# Patient Record
Sex: FEMALE | Race: WHITE
[De-identification: names, ages, dates, MRNs, and addresses within clinical notes are randomized per-mention and may not be internally consistent; named-entity substitution may affect disease eponyms.]

---

## 2017-05-08 ENCOUNTER — HOSPITAL ENCOUNTER (EMERGENCY)
Dept: HOSPITAL 45 - C.EDB | Age: 27
Discharge: HOME | End: 2017-05-08
Payer: COMMERCIAL

## 2017-05-08 VITALS
BODY MASS INDEX: 43.4 KG/M2 | HEIGHT: 69.02 IN | WEIGHT: 293 LBS | BODY MASS INDEX: 43.4 KG/M2 | WEIGHT: 293 LBS | HEIGHT: 69.02 IN

## 2017-05-08 VITALS — TEMPERATURE: 98.06 F

## 2017-05-08 VITALS — HEART RATE: 74 BPM | OXYGEN SATURATION: 100 % | SYSTOLIC BLOOD PRESSURE: 124 MMHG | DIASTOLIC BLOOD PRESSURE: 71 MMHG

## 2017-05-08 DIAGNOSIS — K62.5: Primary | ICD-10-CM

## 2017-05-08 DIAGNOSIS — C95.91: ICD-10-CM

## 2017-05-08 LAB
ALP SERPL-CCNC: 87 U/L (ref 45–117)
ALT SERPL-CCNC: 21 U/L (ref 12–78)
ANION GAP SERPL CALC-SCNC: 8 MMOL/L (ref 3–11)
AST SERPL-CCNC: 13 U/L (ref 15–37)
BASOPHILS # BLD: 0.03 K/UL (ref 0–0.2)
BASOPHILS NFR BLD: 0.4 %
BUN SERPL-MCNC: 8 MG/DL (ref 7–18)
BUN/CREAT SERPL: 13.2 (ref 10–20)
CALCIUM SERPL-MCNC: 9.2 MG/DL (ref 8.5–10.1)
CHLORIDE SERPL-SCNC: 106 MMOL/L (ref 98–107)
CO2 SERPL-SCNC: 27 MMOL/L (ref 21–32)
COMPLETE: YES
CREAT CL PREDICTED SERPL C-G-VRATE: 227.1 ML/MIN
CREAT SERPL-MCNC: 0.59 MG/DL (ref 0.6–1.2)
EOSINOPHIL NFR BLD AUTO: 268 K/UL (ref 130–400)
GLUCOSE SERPL-MCNC: 88 MG/DL (ref 70–99)
HCT VFR BLD CALC: 34.9 % (ref 37–47)
IG%: 0.3 %
IMM GRANULOCYTES NFR BLD AUTO: 42.2 %
LYMPHOCYTES # BLD: 3.08 K/UL (ref 1.2–3.4)
MCH RBC QN AUTO: 30.1 PG (ref 25–34)
MCHC RBC AUTO-ENTMCNC: 34.1 G/DL (ref 32–36)
MCV RBC AUTO: 88.4 FL (ref 80–100)
MONOCYTES NFR BLD: 8.1 %
NEUTROPHILS # BLD AUTO: 3.8 %
NEUTROPHILS NFR BLD AUTO: 45.2 %
PMV BLD AUTO: 8.8 FL (ref 7.4–10.4)
POTASSIUM SERPL-SCNC: 3.7 MMOL/L (ref 3.5–5.1)
RBC # BLD AUTO: 3.95 M/UL (ref 4.2–5.4)
SODIUM SERPL-SCNC: 141 MMOL/L (ref 136–145)
WBC # BLD AUTO: 7.29 K/UL (ref 4.8–10.8)

## 2017-05-08 NOTE — DIAGNOSTIC IMAGING REPORT
Pain. Nausea. GALLBLADDER-ABD LIMITED



CLINICAL HISTORY: RUQ pain nausea



TECHNIQUE: Ultrasound



COMPARISON STUDY:  None



FINDINGS: Several large gallstones. Gallbladder wall and/or duct system are

normal. Common bile that measures 4 mm. Fatty infiltration of liver. Pancreas

and right kidney are unremarkable.



IMPRESSION:  Gallstones. Normal caliber bile ducts. Fatty infiltration of liver.











Electronically signed by:  Babak Pierre M.D.

5/8/2017 5:46 PM



Dictated Date/Time:  5/8/2017 5:45 PM

## 2017-05-08 NOTE — EMERGENCY ROOM VISIT NOTE
History


Report prepared by Carroll:  Andrea Umanzor


Under the Supervision of:  Dr. Elizabeth Bone M.D.


First contact with patient:  16:30


Chief Complaint:  ABDOMINAL PAIN


Stated Complaint:  STOMACH PAINS,BLEEDING WHEN GOING TO RESTROOM


Nursing Triage Summary:  


Pt reports on Friday, when she had BM, there was blood on the toilet paper





Pt reports there is now blood in toilet when having BM





"there is pain in my stomach every time I eat"





History of Present Illness


The patient is a 26 year old female who presents to the Emergency Room with 

complaints of intermittent hematochezia beginning ten days prior to arrival. 

She currently rates her discomfort as a 5/10 in severity. The patient states 

she experienced blood in her stool two Friday's ago and attributed it to her 

menstrual cycle. She notes she experienced blood in her stool the following day

, as well. The patient states there was still blood in her stool following the 

end of her period. She notes she went to [facility] three days ago, and 

they stated the patient had hemorrhoids. The patient denies having blood work 

done at the hospital, and she came to the ED today for a second opinion. She 

states she intermittently experiences abdominal pain on her left and right side 

with eating. The patient notes she ate pizza last night and experienced the 

abdominal pain. She states her last bowel movement today contained a little 

blood clot, as well. The patient notes she still has her gallbladder. She 

denies diarrhea and rectal pain.





   Source of History:  patient


   Onset:  10 days  PTA


   Position:  other (global)


   Symptom Intensity:  5/10


   Quality:  other (hematochezia)


   Timing:  intermittent


   Associated Symptoms:  + abdominal pain, + hematochezia, No diarrhea





Review of Systems


See HPI for pertinent positives & negatives. A total of 10 systems reviewed and 

were otherwise negative.





Past Medical & Surgical


Medical Problems:


(1) Bronchitis


(2) Leukemia








Family History





Cancer


Heart disease


Hypertension





Social History


Smoking Status:  Never Smoker


Marital Status:  


Occupation Status:  unemployed





Current/Historical Medications


No Active Prescriptions or Reported Meds





Allergies


Coded Allergies:  


     Amoxicillin (Verified  Allergy, Unknown, UNKNOWN- CHILDHOOD, 5/8/17)





Physical Exam


Vital Signs











  Date Time  Temp Pulse Resp B/P Pulse Ox O2 Delivery O2 Flow Rate FiO2


 


5/8/17 17:59  74 18 124/71 100 Room Air  


 


5/8/17 15:18 36.7 77 18  97 Room Air  











Physical Exam


Vital signs reviewed.





General: Well-appearing female, in no significant distress.





HEENT: No scleral icterus, PERRLA, neck supple.  Atraumatic.





Cardiovascular: Regular rate and rhythm, no extra sounds.





Pulmonary: Clear to auscultation bilaterally, normal work of breathing.





Abdomen: Morbidly obese. Diffusely tender. Soft, nondistended, positive bowel 

sounds.





Rectal: Small skin tag to the rectum. no visualized hemorrhoid. Guaiac negative 

stool. No rectal fissure appreciated.





Musculoskeletal: Atraumatic, no peripheral edema.





Neurologic: Patient awake alert and oriented x 3, full strength in all 4 

extremities.  Cranial nerves 2 through 12 grossly intact.





Skin: Warm, dry, no rash





Medical Decision & Procedures


ER Provider


Diagnostic Interpretation:


US results as stated below per my review and radiologist interpretation: 





Pain. Nausea. GALLBLADDER-ABD LIMITED





CLINICAL HISTORY: RUQ pain nausea





TECHNIQUE: Ultrasound





COMPARISON STUDY:  None





FINDINGS: Several large gallstones. Gallbladder wall and/or duct system are


normal. Common bile that measures 4 mm. Fatty infiltration of liver. Pancreas


and right kidney are unremarkable.





IMPRESSION:  Gallstones. Normal caliber bile ducts. Fatty infiltration of liver.








Electronically signed by:  Babak Pierre M.D.


5/8/2017 5:46 PM





Laboratory Results


5/8/17 16:50








Red Blood Count 3.95, Mean Corpuscular Volume 88.4, Mean Corpuscular Hemoglobin 

30.1, Mean Corpuscular Hemoglobin Concent 34.1, Mean Platelet Volume 8.8, 

Neutrophils (%) (Auto) 45.2, Lymphocytes (%) (Auto) 42.2, Monocytes (%) (Auto) 

8.1, Eosinophils (%) (Auto) 3.8, Basophils (%) (Auto) 0.4, Neutrophils # (Auto) 

3.29, Lymphocytes # (Auto) 3.08, Monocytes # (Auto) 0.59, Eosinophils # (Auto) 

0.28, Basophils # (Auto) 0.03





5/8/17 16:50

















Test


  5/8/17


16:50


 


White Blood Count


  7.29 K/uL


(4.8-10.8)


 


Red Blood Count


  3.95 M/uL


(4.2-5.4)


 


Hemoglobin


  11.9 g/dL


(12.0-16.0)


 


Hematocrit 34.9 % (37-47) 


 


Mean Corpuscular Volume


  88.4 fL


()


 


Mean Corpuscular Hemoglobin


  30.1 pg


(25-34)


 


Mean Corpuscular Hemoglobin


Concent 34.1 g/dl


(32-36)


 


Platelet Count


  268 K/uL


(130-400)


 


Mean Platelet Volume


  8.8 fL


(7.4-10.4)


 


Neutrophils (%) (Auto) 45.2 % 


 


Lymphocytes (%) (Auto) 42.2 % 


 


Monocytes (%) (Auto) 8.1 % 


 


Eosinophils (%) (Auto) 3.8 % 


 


Basophils (%) (Auto) 0.4 % 


 


Neutrophils # (Auto)


  3.29 K/uL


(1.4-6.5)


 


Lymphocytes # (Auto)


  3.08 K/uL


(1.2-3.4)


 


Monocytes # (Auto)


  0.59 K/uL


(0.11-0.59)


 


Eosinophils # (Auto)


  0.28 K/uL


(0-0.5)


 


Basophils # (Auto)


  0.03 K/uL


(0-0.2)


 


RDW Standard Deviation


  40.1 fL


(36.4-46.3)


 


RDW Coefficient of Variation


  12.4 %


(11.5-14.5)


 


Immature Granulocyte % (Auto) 0.3 % 


 


Immature Granulocyte # (Auto)


  0.02 K/uL


(0.00-0.02)


 


Anion Gap


  8.0 mmol/L


(3-11)


 


Est Creatinine Clear Calc


Drug Dose 227.1 ml/min 


 


 


Estimated GFR (


American) 144.6 


 


 


Estimated GFR (Non-


American 124.7 


 


 


BUN/Creatinine Ratio 13.2 (10-20) 


 


Calcium Level


  9.2 mg/dl


(8.5-10.1)


 


Total Bilirubin


  0.8 mg/dl


(0.2-1)


 


Direct Bilirubin


  0.1 mg/dl


(0-0.2)


 


Aspartate Amino Transf


(AST/SGOT) 13 U/L (15-37) 


 


 


Alanine Aminotransferase


(ALT/SGPT) 21 U/L (12-78) 


 


 


Alkaline Phosphatase


  87 U/L


()


 


Total Protein


  7.4 gm/dl


(6.4-8.2)


 


Albumin


  3.5 gm/dl


(3.4-5.0)








Laboratory results per my review.





ED Course


1637: Past medical records reviewed. The patient was evaluated in room B11B. A 

complete history and physical examination was performed. 





1824: Upon reevaluation, the patient appeared to have improvement of her 

symptoms. I discussed findings with her. She verbalized agreement of the 

treatment plan. The patient was discharged home.





Medical Decision


Differential diagnosis:


Etiologies such as diverticulosis, AVM, coagulopathy, colitis, inflammatory 

bowel disease, malignancy, Jessica-Szymanski tear, esophagitis, peptic ulcer disease

, variceal bleed, gastritis, epistaxis, fissure, hemorrhoids, as well as others 

were entertained.





This patient was evaluated and appeared to be in no significant distress.  

Physical examination reveals a morbidly obese female without specific abdominal 

tenderness.  Rectal exam reveals no gross blood, guaiac negative stools.  

Laboratory work reveals stable blood counts.  An ultrasound of right upper 

quadrant was performed that reveals cholelithiasis, no evidence of 

cholecystitis.  Patient was informed of the findings.  She was advised to 

maintain a soft stool.  Patient likely has an internal hemorrhoid or rectal 

fissure the periodically bleeds.  She was advised to follow-up with primary 

care physician or gastroenterology as needed.  She will return to the ER for 

worsening of symptoms or any medical concerns.





Impression





 Primary Impression:  


 Rectal bleeding





Scribe Attestation


The scribe's documentation has been prepared under my direction and personally 

reviewed by me in its entirety. I confirm that the note above accurately 

reflects all work, treatment, procedures, and medical decision making performed 

by me.





Departure Information


Dispostion


Home / Self-Care





Prescriptions





No Active Prescriptions or Reported Meds





Referrals


No Doctor, Assigned (PCP)





Forms


HOME CARE DOCUMENTATION FORM,                                                 

               IMPORTANT VISIT INFORMATION





Patient Instructions


My [facility]





Additional Instructions





Diagnosis: Rectal bleeding





Keep stools soft, Colace 100 mg twice daily as needed for stool softening.





Drink plenty of clear fluids and increase the fiber in your diet.





Follow-up with a primary care physician as soon as possible.





Return to the ER for worsening of symptoms or any medical concerns.

## 2017-07-26 ENCOUNTER — HOSPITAL ENCOUNTER (EMERGENCY)
Dept: HOSPITAL 45 - C.EDB | Age: 27
Discharge: HOME | End: 2017-07-26
Payer: COMMERCIAL

## 2017-07-26 VITALS — HEART RATE: 74 BPM | SYSTOLIC BLOOD PRESSURE: 127 MMHG | DIASTOLIC BLOOD PRESSURE: 76 MMHG | OXYGEN SATURATION: 98 %

## 2017-07-26 VITALS
WEIGHT: 293 LBS | WEIGHT: 293 LBS | BODY MASS INDEX: 43.4 KG/M2 | HEIGHT: 69.02 IN | BODY MASS INDEX: 43.4 KG/M2 | HEIGHT: 69.02 IN

## 2017-07-26 VITALS — TEMPERATURE: 99.32 F

## 2017-07-26 DIAGNOSIS — Z79.899: ICD-10-CM

## 2017-07-26 DIAGNOSIS — R10.13: Primary | ICD-10-CM

## 2017-07-26 DIAGNOSIS — Z82.49: ICD-10-CM

## 2017-07-26 DIAGNOSIS — Z80.9: ICD-10-CM

## 2017-07-26 DIAGNOSIS — Z85.6: ICD-10-CM

## 2017-07-26 LAB
ALP SERPL-CCNC: 92 U/L (ref 45–117)
ALT SERPL-CCNC: 20 U/L (ref 12–78)
ANION GAP SERPL CALC-SCNC: 6 MMOL/L (ref 3–11)
APPEARANCE UR: (no result)
AST SERPL-CCNC: 14 U/L (ref 15–37)
BASOPHILS # BLD: 0.03 K/UL (ref 0–0.2)
BASOPHILS NFR BLD: 0.4 %
BILIRUB UR-MCNC: (no result) MG/DL
BUN SERPL-MCNC: 9 MG/DL (ref 7–18)
BUN/CREAT SERPL: 16.5 (ref 10–20)
CALCIUM SERPL-MCNC: 9.1 MG/DL (ref 8.5–10.1)
CHLORIDE SERPL-SCNC: 107 MMOL/L (ref 98–107)
CO2 SERPL-SCNC: 28 MMOL/L (ref 21–32)
COLOR UR: YELLOW
COMPLETE: YES
CREAT CL PREDICTED SERPL C-G-VRATE: 239.8 ML/MIN
CREAT SERPL-MCNC: 0.56 MG/DL (ref 0.6–1.2)
EOSINOPHIL NFR BLD AUTO: 252 K/UL (ref 130–400)
GLUCOSE SERPL-MCNC: 97 MG/DL (ref 70–99)
HCT VFR BLD CALC: 34.6 % (ref 37–47)
IG%: 0.1 %
IMM GRANULOCYTES NFR BLD AUTO: 38.7 %
LYMPHOCYTES # BLD: 2.6 K/UL (ref 1.2–3.4)
MANUAL MICROSCOPIC REQUIRED?: NO
MCH RBC QN AUTO: 30.3 PG (ref 25–34)
MCHC RBC AUTO-ENTMCNC: 34.1 G/DL (ref 32–36)
MCV RBC AUTO: 88.7 FL (ref 80–100)
MONOCYTES NFR BLD: 7.1 %
NEUTROPHILS # BLD AUTO: 3.4 %
NEUTROPHILS NFR BLD AUTO: 50.3 %
NITRITE UR QL STRIP: (no result)
PH UR STRIP: 5 [PH] (ref 4.5–7.5)
PMV BLD AUTO: 9.5 FL (ref 7.4–10.4)
POTASSIUM SERPL-SCNC: 3.7 MMOL/L (ref 3.5–5.1)
RBC # BLD AUTO: 3.9 M/UL (ref 4.2–5.4)
REVIEW REQ?: NO
SODIUM SERPL-SCNC: 141 MMOL/L (ref 136–145)
SP GR UR STRIP: 1.03 (ref 1–1.03)
URINE BILL WITH OR WITHOUT MIC: (no result)
URINE EPITHELIAL CELL AUTO: >30 /LPF (ref 0–5)
UROBILINOGEN UR-MCNC: (no result) MG/DL
WBC # BLD AUTO: 6.72 K/UL (ref 4.8–10.8)
ZZUR CULT IF INDIC CLEAN CATCH: YES

## 2017-07-26 NOTE — EMERGENCY ROOM VISIT NOTE
History


First contact with patient:  15:15


Chief Complaint:  ABDOMINAL PAIN


Stated Complaint:  STOMACH PAIN, RT SIDE, EAR NOISES RT SIDE





History of Present Illness


The patient is a 26 year old female who presents to the Emergency Room with 

complaints of abdominal pain which has been intermittent for 3 days.  The 

patient states that she has been experiencing a sharp, stabbing pain in her 

upper abdomen over the past 3 days.  She denies any pain at this time.  She 

denies any nausea or vomiting associated with the pain.  She denies any changes 

in bowel movements, urinary symptoms or shortness of breath.  She is not able 

to correlate the pain with eating or other activities.  She denies any history 

of abdominal issues.  She does state that she was seen here for abdominal pain 

and had a gallbladder ultrasound which she believes showed gallstones or kidney 

stones.  The patient also reports a "whooshing" noise in her right ear for the 

past few weeks.  She denies any pain in the ear.





Review of Systems


A complete 10 point review of systems was reviewed with the patient with 

pertinent positives and negatives as per history of present illness. All else 

were negative.





Past Medical/Surgical History


Medical Problems:


(1) Bronchitis


(2) Leukemia








Family History





Cancer


Heart disease


Hypertension





Social History


Smoking Status:  Never Smoker


Marital Status:  


Occupation Status:  unemployed





Current/Historical Medications


Scheduled


Omeprazole (Prilosec), 40 MG PO DAILY





Physical Exam


Vital Signs











  Date Time  Temp Pulse Resp B/P (MAP) Pulse Ox O2 Delivery O2 Flow Rate FiO2


 


7/26/17 17:42  74 18 127/76 98   


 


7/26/17 15:07 37.4 82 20 131/87 99 Room Air  











Physical Exam


VITALS: Vitals are noted on the nurse's note and reviewed by myself.  Vital 

signs stable.


GENERAL: This is a 26-year-old female, in no acute distress, nondiaphoretic, 

well-developed well-nourished.


EARS: External auditory canals clear, tympanic membranes pearly gray without 

erythema or effusion bilaterally.


EYES: Pupils equal round and reactive to light and accommodation.  


MOUTH: Mucous membranes moist.  Tonsils are not enlarged.  Pharynx without 

erythema or exudate. 


HEART: Regular rate and rhythm without murmurs gallops or rubs.


LUNGS: Clear to auscultation bilaterally without wheezes, rales or rhonchi. 


ABDOMEN: Positive bowel sounds x 4.  Soft, nontender to palpation.  Negative 

Jack sign.


NEURO: Patient was alert and oriented to person place and time.





Medical Decision & Procedures


Laboratory Results


7/26/17 16:00








Red Blood Count 3.90, Mean Corpuscular Volume 88.7, Mean Corpuscular Hemoglobin 

30.3, Mean Corpuscular Hemoglobin Concent 34.1, Mean Platelet Volume 9.5, 

Neutrophils (%) (Auto) 50.3, Lymphocytes (%) (Auto) 38.7, Monocytes (%) (Auto) 

7.1, Eosinophils (%) (Auto) 3.4, Basophils (%) (Auto) 0.4, Neutrophils # (Auto) 

3.37, Lymphocytes # (Auto) 2.60, Monocytes # (Auto) 0.48, Eosinophils # (Auto) 

0.23, Basophils # (Auto) 0.03





7/26/17 16:00

















Test


  7/26/17


16:00 7/26/17


16:04


 


White Blood Count


  6.72 K/uL


(4.8-10.8) 


 


 


Red Blood Count


  3.90 M/uL


(4.2-5.4) 


 


 


Hemoglobin


  11.8 g/dL


(12.0-16.0) 


 


 


Hematocrit 34.6 % (37-47)  


 


Mean Corpuscular Volume


  88.7 fL


() 


 


 


Mean Corpuscular Hemoglobin


  30.3 pg


(25-34) 


 


 


Mean Corpuscular Hemoglobin


Concent 34.1 g/dl


(32-36) 


 


 


Platelet Count


  252 K/uL


(130-400) 


 


 


Mean Platelet Volume


  9.5 fL


(7.4-10.4) 


 


 


Neutrophils (%) (Auto) 50.3 %  


 


Lymphocytes (%) (Auto) 38.7 %  


 


Monocytes (%) (Auto) 7.1 %  


 


Eosinophils (%) (Auto) 3.4 %  


 


Basophils (%) (Auto) 0.4 %  


 


Neutrophils # (Auto)


  3.37 K/uL


(1.4-6.5) 


 


 


Lymphocytes # (Auto)


  2.60 K/uL


(1.2-3.4) 


 


 


Monocytes # (Auto)


  0.48 K/uL


(0.11-0.59) 


 


 


Eosinophils # (Auto)


  0.23 K/uL


(0-0.5) 


 


 


Basophils # (Auto)


  0.03 K/uL


(0-0.2) 


 


 


RDW Standard Deviation


  39.9 fL


(36.4-46.3) 


 


 


RDW Coefficient of Variation


  12.5 %


(11.5-14.5) 


 


 


Immature Granulocyte % (Auto) 0.1 %  


 


Immature Granulocyte # (Auto)


  0.01 K/uL


(0.00-0.02) 


 


 


Anion Gap


  6.0 mmol/L


(3-11) 


 


 


Est Creatinine Clear Calc


Drug Dose 239.8 ml/min 


  


 


 


Estimated GFR (


American) 147.1 


  


 


 


Estimated GFR (Non-


American 126.9 


  


 


 


BUN/Creatinine Ratio 16.5 (10-20)  


 


Calcium Level


  9.1 mg/dl


(8.5-10.1) 


 


 


Total Bilirubin


  0.6 mg/dl


(0.2-1) 


 


 


Direct Bilirubin


  0.1 mg/dl


(0-0.2) 


 


 


Aspartate Amino Transf


(AST/SGOT) 14 U/L (15-37) 


  


 


 


Alanine Aminotransferase


(ALT/SGPT) 20 U/L (12-78) 


  


 


 


Alkaline Phosphatase


  92 U/L


() 


 


 


Total Protein


  6.7 gm/dl


(6.4-8.2) 


 


 


Albumin


  3.1 gm/dl


(3.4-5.0) 


 


 


Lipase


  99 U/L


() 


 


 


Urine Color  YELLOW 


 


Urine Appearance  CLOUDY (CLEAR) 


 


Urine pH  5.0 (4.5-7.5) 


 


Urine Specific Gravity


  


  1.026


(1.000-1.030)


 


Urine Protein  NEG (NEG) 


 


Urine Glucose (UA)  NEG (NEG) 


 


Urine Ketones  NEG (NEG) 


 


Urine Occult Blood  NEG (NEG) 


 


Urine Nitrite  NEG (NEG) 


 


Urine Bilirubin  NEG (NEG) 


 


Urine Urobilinogen  NEG (NEG) 


 


Urine Leukocyte Esterase  MODERATE (NEG) 


 


Urine WBC (Auto)


  


  5-10 /hpf


(0-5)


 


Urine RBC (Auto)  0-4 /hpf (0-4) 


 


Urine Hyaline Casts (Auto)  1-5 /lpf (0-5) 


 


Urine Epithelial Cells (Auto)  >30 /lpf (0-5) 


 


Urine Bacteria (Auto)  1+ (NEG) 


 


Urine Pregnancy Test  NEG (NEG) 











Medications Administered











 Medications


  (Trade)  Dose


 Ordered  Sig/Jaz


 Route  Start Time


 Stop Time Status Last Admin


Dose Admin


 


 Sodium Chloride  1,000 ml @ 


 999 mls/hr  Q1H1M STAT


 IV  7/26/17 15:30


 7/26/17 16:30 DC 7/26/17 16:04


999 MLS/HR











Medical Decision


Differential diagnosis includes gallbladder disease, gastritis, peptic ulcer 

disease, GERD, colitis, among others.





The patient is a 26-year-old female who presents today complaining of 

intermittent epigastric pain.  Labs revealed no leukocytosis, anemia or 

concerning electrolyte abnormalities.  Lipase was within normal limits.  Kidney 

and liver functions were within normal limits.  Urinalysis was not suggestive 

of infection.  Urine pregnancy was negative.  The patient has had an ultrasound 

of her gallbladder in the past which did show gallstones.  This could be 

contributing to her symptoms and she was given the information for a general 

surgeon.  The patient's symptoms seem to be consistent with gastritis/GERD.  

She will be placed on omeprazole and was instructed to make some lifestyle 

changes to help with her symptoms.  She was referred back to her PCP for 

further evaluation.  The right ear was unremarkable on exam and the patient was 

referred to ENT for evaluation of this.





Based on the patient's presentation and work up, I feel the patient is stable 

for outpatient treatment.  The patient was educated to return to the emergency 

department for any worsening of their current condition or new/concerning 

symptoms.  She will follow up with her PCP and Gen. surgery as needed.





Medication Reconcilliation


Current Medication List:  was personally reviewed by me





Blood Pressure Screening


Patient's blood pressure:  Normal blood pressure





Impression





 Primary Impression:  


 Epigastric abdominal pain





Departure Information


Dispostion


Home / Self-Care





Condition


GOOD





Prescriptions





Omeprazole (PRILOSEC) 40 Mg Cap


40 MG PO DAILY for 14 Days, #14 CAP


   Prov: Grace Zayas .BRITT         7/26/17





Referrals


No Doctor, Assigned (PCP)








Ben Castellanos MD Ramondelli, Salvatore, M.D.





Patient Instructions


ED GERD, My [facility]





Additional Instructions





Take the omeprazole as prescribed.





For pain control, you can use the following over-the-counter medicines (if >13 yo):





- Regular strength (325mg/tab) Tylenol (acetaminophen) 2 tabs every 4-6 hours 

as needed. Do not exceed 12 tablets in a 24 hour period. Avoid taking more than 

4 grams (4000 mg) of Tylenol per day. This includes any other sources of 

acetaminophen you may take on a regular basis.





You should avoid spicy foods, tomato-based foods, citrus, alcohol, caffeine, 

chocolate as this may worsen your symptoms.





You should not lie down for a few hours after eating to help lessen symptoms.





You may follow up with General surgery (Dr. Hemphill) for further evaluation 

of your gallstones if you have persistent discomfort.





Follow-up with ENT for further evaluation of your right ear symptoms.





Follow-up with your primary care provider regarding all of your symptoms.





Return to the emergency department with any severe abdominal pain, vomiting, 

fevers, or any other worsening or new/concerning symptoms.

## 2017-09-29 ENCOUNTER — HOSPITAL ENCOUNTER (EMERGENCY)
Dept: HOSPITAL 45 - C.EDB | Age: 27
Discharge: HOME | End: 2017-09-29
Payer: COMMERCIAL

## 2017-09-29 VITALS
TEMPERATURE: 98.42 F | HEART RATE: 90 BPM | SYSTOLIC BLOOD PRESSURE: 159 MMHG | DIASTOLIC BLOOD PRESSURE: 89 MMHG | OXYGEN SATURATION: 96 %

## 2017-09-29 VITALS
HEIGHT: 69.02 IN | BODY MASS INDEX: 43.4 KG/M2 | HEIGHT: 69.02 IN | WEIGHT: 293 LBS | BODY MASS INDEX: 43.4 KG/M2 | WEIGHT: 293 LBS

## 2017-09-29 DIAGNOSIS — Z80.9: ICD-10-CM

## 2017-09-29 DIAGNOSIS — D25.9: Primary | ICD-10-CM

## 2017-09-29 DIAGNOSIS — Z88.1: ICD-10-CM

## 2017-09-29 DIAGNOSIS — N39.3: ICD-10-CM

## 2017-09-29 DIAGNOSIS — Z82.49: ICD-10-CM

## 2017-09-29 LAB
ANION GAP SERPL CALC-SCNC: 8 MMOL/L (ref 3–11)
BASOPHILS # BLD: 0.02 K/UL (ref 0–0.2)
BASOPHILS NFR BLD: 0.3 %
BUN SERPL-MCNC: 9 MG/DL (ref 7–18)
BUN/CREAT SERPL: 14.9 (ref 10–20)
CALCIUM SERPL-MCNC: 9.5 MG/DL (ref 8.5–10.1)
CHLORIDE SERPL-SCNC: 105 MMOL/L (ref 98–107)
CO2 SERPL-SCNC: 27 MMOL/L (ref 21–32)
COMPLETE: YES
CREAT CL PREDICTED SERPL C-G-VRATE: 212.7 ML/MIN
CREAT SERPL-MCNC: 0.63 MG/DL (ref 0.6–1.2)
EOSINOPHIL NFR BLD AUTO: 280 K/UL (ref 130–400)
GLUCOSE SERPL-MCNC: 111 MG/DL (ref 70–99)
HCT VFR BLD CALC: 34.9 % (ref 37–47)
IG%: 0.1 %
IMM GRANULOCYTES NFR BLD AUTO: 34.3 %
INR PPP: 1 (ref 0.9–1.1)
LYMPHOCYTES # BLD: 2.4 K/UL (ref 1.2–3.4)
MCH RBC QN AUTO: 29.5 PG (ref 25–34)
MCHC RBC AUTO-ENTMCNC: 33.2 G/DL (ref 32–36)
MCV RBC AUTO: 88.8 FL (ref 80–100)
MONOCYTES NFR BLD: 6.4 %
NEUTROPHILS # BLD AUTO: 2 %
NEUTROPHILS NFR BLD AUTO: 56.9 %
PMV BLD AUTO: 9 FL (ref 7.4–10.4)
POTASSIUM SERPL-SCNC: 3.4 MMOL/L (ref 3.5–5.1)
PREG INTERNAL NEGATIVE QC: (no result)
PREG INTERNAL POSITIVE QC: (no result)
PROTHROMBIN TIME: 10.3 SECONDS (ref 9–12)
RBC # BLD AUTO: 3.93 M/UL (ref 4.2–5.4)
SODIUM SERPL-SCNC: 140 MMOL/L (ref 136–145)
WBC # BLD AUTO: 6.99 K/UL (ref 4.8–10.8)

## 2017-09-29 NOTE — EMERGENCY ROOM VISIT NOTE
History


Report prepared by Carroll:  Jones Caldera


Under the Supervision of:  Dr. Ree Carlisle D.O.


First contact with patient:  15:05


Chief Complaint:  VAGINAL BLEEDING


Stated Complaint:  HEAVY BLEEDING/CLOTS FOR 2 MONTHS





History of Present Illness


The patient is a 27 year old female who presents to the Emergency Room with 

complaints of persistent vaginal bleeding beginning two months ago. She states 

that she has had two days without bleeding within the past two months. She 

states that she began passing large clots yesterday. The patient also complains 

of shakiness and nausea while passing the clots. She denies any abnormal 

abdominal pain, dizziness, or vomiting. She states that she has had some 

increased shortness of breath recently. The patient has no history of abnormal 

menstrual periods. She estimates that she was 11-12 years old when she 

initially got her period. She notes that she has a history of early onset 

menopause around the age of 28. The patient is not on birth control.





   Source of History:  patient


   Onset:  Two months ago


   Position:  other (vagina)


   Quality:  other (bleeding)


   Timing:  other (persistent)


   Associated Symptoms:  + SOB, + nausea (while passing clots), No vomiting, No 

abdominal pain (abnormal)


Note:


The patient also complains of shakiness while passing the clots.





Review of Systems


See HPI for pertinent positives & negatives. A total of 10 systems reviewed and 

were otherwise negative.





Past Medical & Surgical


Medical Problems:


(1) Bronchitis


(2) Leukemia








Family History





Cancer


Heart disease


Hypertension





Social History


Smoking Status:  Never Smoker


Marital Status:  


Occupation Status:  unemployed





Current/Historical Medications


No Active Prescriptions or Reported Meds





Allergies


Coded Allergies:  


     Amoxicillin (Verified  Allergy, Unknown, UNKNOWN- CHILDHOOD, 5/8/17)





Physical Exam


Vital Signs











  Date Time  Temp Pulse Resp B/P (MAP) Pulse Ox O2 Delivery O2 Flow Rate FiO2


 


9/29/17 17:52 36.9 90 20 159/89 96   


 


9/29/17 17:37  90 20 159/89 96 Room Air  


 


9/29/17 15:01 36.9 79 16 126/91 97 Room Air  











Physical Exam


GENERAL: alert, well appearing, well nourished, no distress, non-toxic 


EYE EXAM: normal conjunctiva, PERRL and EOM's grossly intact


OROPHARYNX: no exudate, no erythema, lips, buccal mucosa, and tongue normal and 

mucous membranes are moist


NECK: supple, no nuchal rigidity, no adenopathy, non-tender


LUNGS: Clear to auscultation. Normal chest wall mechanics


HEART: no murmurs, S1 normal and S2 normal 


ABDOMEN: abdomen obese, soft, non-tender, normo-active bowel sounds, no masses, 

no rebound or guarding. 


BACK: Back is symmetrical on inspection and there is no deformity, no midline 

tenderness, no CVA tenderness. 


SKIN: no rashes and no bruising 


UPPER EXTREMITIES: upper extremities are grossly normal. 


LOWER EXTREMITIES: No pitting edema.


NEURO EXAM: Normal sensorium, cranial nerves II-XII grossly intact, normal 

speech,  no gross weakness of arms, no gross weakness of legs.





Medical Decision & Procedures


ER Provider


Diagnostic Interpretation:


Radiology results have been interpreted by the radiologist and reviewed by me.





PELVIC ULTRASOUND, TRANSABDOMINAL AND TRANSVAGINAL





FINDINGS: 


Uterus: 7.6 x 4.6 x 4.7 cm. There is a 5.9 x 4.9 x 4.9 cm heterogeneous lesion


within the left side of the uterus which demonstrates posterior shadowing. This


favors a fibroid.  





Endometrial stripe: Top normal in thickness measuring 1.4 cm. The endometrial


stripe is slightly heterogeneous and demonstrates mobile debris suggestive of


blood products. 





Right ovary: Normal in size and demonstrates normal color flow. Dominant 3.3 cm


cyst.





Left ovary: Normal in size and demonstrates normal color flow. Partially


obscured by the uterine fibroid.





Miscellaneous:No pelvic free fluid.





IMPRESSION:  





1. A 5.9 x 4.9 x 4.9 cm lesion within the left side of the uterus. This likely


represents an intramural fibroid.


2. A 3.3 cm right ovarian simple cyst.


3. The endometrial stripe is top normal in thickness and is heterogeneous


demonstrating mobile debris. This is suggestive of blood products. Follow-up


pelvic ultrasound is recommended to ensure resolution. Gynecologic consultation


is also recommended.





Electronically signed by:  Tyree Pineda M.D.


9/29/2017 4:49 PM








CHEST ONE VIEW PORTABLE





FINDINGS: 


Cardiomediastinal and hilar silhouettes are within normal limits. There is no


pneumothorax, pleural effusion or focal airspace consolidation. No overt


pulmonary edema. Patient obesity is noted. Bones about the chest are grossly


intact.





IMPRESSION: No acute cardiopulmonary process. 





The above report was generated using voice recognition software. It may contain


grammatical, syntax or spelling errors.





Electronically signed by:  Chan Kemp M.D.


9/29/2017 3:51 PM





Laboratory Results


9/29/17 15:50








Red Blood Count 3.93, Mean Corpuscular Volume 88.8, Mean Corpuscular Hemoglobin 

29.5, Mean Corpuscular Hemoglobin Concent 33.2, Mean Platelet Volume 9.0, 

Neutrophils (%) (Auto) 56.9, Lymphocytes (%) (Auto) 34.3, Monocytes (%) (Auto) 

6.4, Eosinophils (%) (Auto) 2.0, Basophils (%) (Auto) 0.3, Neutrophils # (Auto) 

3.97, Lymphocytes # (Auto) 2.40, Monocytes # (Auto) 0.45, Eosinophils # (Auto) 

0.14, Basophils # (Auto) 0.02





9/29/17 15:50

















Test


  9/29/17


15:50


 


White Blood Count


  6.99 K/uL


(4.8-10.8)


 


Red Blood Count


  3.93 M/uL


(4.2-5.4)


 


Hemoglobin


  11.6 g/dL


(12.0-16.0)


 


Hematocrit 34.9 % (37-47) 


 


Mean Corpuscular Volume


  88.8 fL


()


 


Mean Corpuscular Hemoglobin


  29.5 pg


(25-34)


 


Mean Corpuscular Hemoglobin


Concent 33.2 g/dl


(32-36)


 


Platelet Count


  280 K/uL


(130-400)


 


Mean Platelet Volume


  9.0 fL


(7.4-10.4)


 


Neutrophils (%) (Auto) 56.9 % 


 


Lymphocytes (%) (Auto) 34.3 % 


 


Monocytes (%) (Auto) 6.4 % 


 


Eosinophils (%) (Auto) 2.0 % 


 


Basophils (%) (Auto) 0.3 % 


 


Neutrophils # (Auto)


  3.97 K/uL


(1.4-6.5)


 


Lymphocytes # (Auto)


  2.40 K/uL


(1.2-3.4)


 


Monocytes # (Auto)


  0.45 K/uL


(0.11-0.59)


 


Eosinophils # (Auto)


  0.14 K/uL


(0-0.5)


 


Basophils # (Auto)


  0.02 K/uL


(0-0.2)


 


RDW Standard Deviation


  40.6 fL


(36.4-46.3)


 


RDW Coefficient of Variation


  12.5 %


(11.5-14.5)


 


Immature Granulocyte % (Auto) 0.1 % 


 


Immature Granulocyte # (Auto)


  0.01 K/uL


(0.00-0.02)


 


Prothrombin Time


  10.3 SECONDS


(9.0-12.0)


 


Prothromb Time International


Ratio 1.0 (0.9-1.1) 


 


 


Anion Gap


  8.0 mmol/L


(3-11)


 


Est Creatinine Clear Calc


Drug Dose 212.7 ml/min 


 


 


Estimated GFR (


American) 140.5 


 


 


Estimated GFR (Non-


American 121.2 


 


 


BUN/Creatinine Ratio 14.9 (10-20) 


 


Calcium Level


  9.5 mg/dl


(8.5-10.1)


 


Troponin I


  < 0.015 ng/ml


(0-0.045)


 


Human Chorionic Gonadotropin,


Qual NEG (NEG) 


 





Laboratory results per my review.





ECG


Indication:  SOB/dyspnea


Rate (beats per minute):  69


Rhythm:  normal sinus


Findings:  no acute ischemic change, no ectopy, other (normal axis. Normal 

intervals. )





ED Course


1516: The patient was evaluated in room B10. A complete history and physical 

exam was performed. 





1743: Upon reevaluation, the patient is feeling better. I discussed the 

findings and the treatment plan with the patient.  She verbalizes agreement and 

understanding.  She was discharged home.





Medical Decision


Differential diagnosis:


Etiologies such as ectopic pregnancy, dysfunction uterine bleeding, bleeding 

dyscrasia, trauma, infection, as well as others were entertained.








Pt well appearing here.  Not orthostatic.  No hx of STD's.  H/H stable.  

Discussed all results and f/u with ob/gyn.  Discussed sx to watch/return for, 

she verbalized understanding and was agreeable with plan.  No other sx of 

bleeding.  Doubt bleeding dyscrasia.  Nml plts.  Likely atypical bleeding 

related to fibroid.





Impression





 Primary Impression:  


 Abnormal vaginal bleeding


 Additional Impression:  


 Uterine fibroid





Scribe Attestation


The scribe's documentation has been prepared under my direction and personally 

reviewed by me in its entirety. I confirm that the note above accurately 

reflects all work, treatment, procedures, and medical decision making performed 

by me.





Departure Information


Dispostion


Home / Self-Care





Prescriptions





No Active Prescriptions or Reported Meds





Referrals


No Doctor, Assigned (PCP)





Patient Instructions


My [facility]





Additional Instructions





Please follow up with your OB/GYN.  If you have any worsening pain, worsening 

bleeding, develop passing out or lightheadedness, vomiting, fevers, chest pain, 

trouble breathing, or you have any other new concerns, please return to the 

emergency room.





Problem Qualifiers








 Additional Impression:  


 Uterine fibroid


 Uterine leiomyoma location:  unspecified location  Qualified Codes:  D25.9 - 

Leiomyoma of uterus, unspecified

## 2017-09-29 NOTE — DIAGNOSTIC IMAGING REPORT
PELVIC ULTRASOUND, TRANSABDOMINAL AND TRANSVAGINAL



HISTORY:      abnormal uterine bleeding



COMPARISON: None.



FINDINGS: 

Uterus: 7.6 x 4.6 x 4.7 cm. There is a 5.9 x 4.9 x 4.9 cm heterogeneous lesion

within the left side of the uterus which demonstrates posterior shadowing. This

favors a fibroid.  



Endometrial stripe: Top normal in thickness measuring 1.4 cm. The endometrial

stripe is slightly heterogeneous and demonstrates mobile debris suggestive of

blood products. 



Right ovary: Normal in size and demonstrates normal color flow. Dominant 3.3 cm

cyst.



Left ovary: Normal in size and demonstrates normal color flow. Partially

obscured by the uterine fibroid.



Miscellaneous:No pelvic free fluid.



IMPRESSION:  



1. A 5.9 x 4.9 x 4.9 cm lesion within the left side of the uterus. This likely

represents an intramural fibroid.

2. A 3.3 cm right ovarian simple cyst.

3. The endometrial stripe is top normal in thickness and is heterogeneous

demonstrating mobile debris. This is suggestive of blood products. Follow-up

pelvic ultrasound is recommended to ensure resolution. Gynecologic consultation

is also recommended.







Electronically signed by:  Tyree Pineda M.D.

9/29/2017 4:49 PM



Dictated Date/Time:  9/29/2017 4:46 PM

## 2017-09-29 NOTE — DIAGNOSTIC IMAGING REPORT
CHEST ONE VIEW PORTABLE



HISTORY:  27 years-old Female sob acute shortness of breath.



COMPARISON: None available.



TECHNIQUE: Portable upright AP view of the chest



FINDINGS: 

Cardiomediastinal and hilar silhouettes are within normal limits. There is no

pneumothorax, pleural effusion or focal airspace consolidation. No overt

pulmonary edema. Patient obesity is noted. Bones about the chest are grossly

intact.



IMPRESSION: No acute cardiopulmonary process. 







The above report was generated using voice recognition software. It may contain

grammatical, syntax or spelling errors.







Electronically signed by:  Chan Kemp M.D.

9/29/2017 3:51 PM



Dictated Date/Time:  9/29/2017 3:51 PM

## 2018-04-02 ENCOUNTER — HOSPITAL ENCOUNTER (EMERGENCY)
Dept: HOSPITAL 45 - C.EDB | Age: 28
Discharge: HOME | End: 2018-04-02
Payer: COMMERCIAL

## 2018-04-02 VITALS
HEIGHT: 69.02 IN | HEIGHT: 69.02 IN | WEIGHT: 293 LBS | WEIGHT: 293 LBS | BODY MASS INDEX: 43.4 KG/M2 | BODY MASS INDEX: 43.4 KG/M2

## 2018-04-02 VITALS — SYSTOLIC BLOOD PRESSURE: 143 MMHG | HEART RATE: 87 BPM | OXYGEN SATURATION: 97 % | DIASTOLIC BLOOD PRESSURE: 94 MMHG

## 2018-04-02 VITALS — TEMPERATURE: 97.52 F

## 2018-04-02 DIAGNOSIS — C95.90: ICD-10-CM

## 2018-04-02 DIAGNOSIS — Z82.49: ICD-10-CM

## 2018-04-02 DIAGNOSIS — S46.911A: Primary | ICD-10-CM
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
## 2018-04-02 NOTE — DIAGNOSTIC IMAGING REPORT
RIGHT SHOULDER 3 VIEWS



CLINICAL HISTORY: Right shoulder pain.



FINDINGS: 3 views of the right shoulder are obtained. No prior studies are

available for comparison at the time of dictation. The skeletal structures are

well mineralized. No fracture or dislocation is seen. The glenohumeral and

acromioclavicular joints are well-maintained. The overlying soft tissues are

normal in appearance. The imaged right upper lobe lung parenchyma appears clear.



IMPRESSION: Unremarkable radiographic assessment of the right shoulder.







Electronically signed by:  Junior Sadler M.D.

4/2/2018 2:10 PM



Dictated Date/Time:  4/2/2018 2:09 PM

## 2018-04-02 NOTE — EMERGENCY ROOM VISIT NOTE
History


First contact with patient:  13:37


Chief Complaint:  SHOULDER PAIN


Stated Complaint:  SHOULDER PAIN WHEN LIFTING ARM





History of Present Illness


The patient is a 27 year old female who presents to the Emergency Room with 

complaints of persistent right shoulder pain.  The patient reports that she 

fell approximately 2-3 weeks ago and landed on a sidewalk.  The patient reports 

that she landed on her elbow.  The pain initially was at the elbow, but then 

started to cause discomfort in her shoulder.  She reports that the elbow pain 

improved.  She denies any pain extending into the posterior shoulder or neck 

region.  She denies any paresthesias or numbness of the right upper extremity.  

Range of motion worsens her discomfort.  She denies any paresthesias or 

numbness of the right upper extremity, and is right-hand dominant.  She denies 

any pain that awakens her at nighttime.  She currently rates her discomfort a 

10 out of 10.





Review of Systems


10 system review was performed and was negative except for pertinent positives 

and negatives as indicated in history of present illness





Past Medical/Surgical History


Medical Problems:


(1) Bronchitis


(2) Leukemia








Family History





Cancer


Heart disease


Hypertension





Social History


Smoking Status:  Never Smoker


Alcohol Use:  none


Marital Status:  


Occupation Status:  employed





Physical Exam


Vital Signs











  Date Time  Temp Pulse Resp B/P (MAP) Pulse Ox O2 Delivery O2 Flow Rate FiO2


 


4/2/18 13:33 36.4 78 20 136/83 96 Room Air  











Physical Exam


CONSTITUTIONAL:  Healthy and well nourished.  Alert and oriented X 3 with 

positive affect.  Patient does not appear in any acute distress.


HEENT:  Normocephalic, atraumatic.  Pupils equal, round and reactive.  


NECK:  Full active range of motion without discomfort.


MUSCULOSKELETAL: Examination of the right shoulder does not show any soft 

tissue edema or ecchymosis.  The patient has minimal tenderness to palpation 

over the anterior shoulder region.  She has no focal tenderness through the 

bicipital groove or biceps/triceps musculature.  She has no worsening pain with 

gentle internal or external rotation.  She has full active range of motion of 

the shoulder with only mild discomfort with movement above shoulder level.  

Negative drop arm test.  Negative empty can sign.  No focal tenderness over the 

acromioclavicular joint.  Distal pulses are intact.


INTEGUMENTARY:  No rash or other significant dermatologic conditions noted.


NEUROLOGIC: Left upper extremity and deltoid sensation are intact.





Medical Decision & Procedures


ER Provider


Diagnostic Interpretation:


My interpretation of right shoulder x-rays does not show any acute separation, 

fractures or dislocation.  Radiologist report is as follows:





RIGHT SHOULDER 3 VIEWS





CLINICAL HISTORY: Right shoulder pain.





FINDINGS: 3 views of the right shoulder are obtained. No prior studies are


available for comparison at the time of dictation. The skeletal structures are


well mineralized. No fracture or dislocation is seen. The glenohumeral and


acromioclavicular joints are well-maintained. The overlying soft tissues are


normal in appearance. The imaged right upper lobe lung parenchyma appears clear.





IMPRESSION: Unremarkable radiographic assessment of the right shoulder.





ED Course


Patient history and physical exam were performed.  Nurse's notes were reviewed.

  Vital signs were reviewed and were normal.  The patient refused any 

analgesics on initial exam.  X-rays of the right shoulder were normal.  The 

patient was advised that her symptoms are consistent with a soft tissue injury 

in her shoulder.  The patient was encouraged to follow-up with [facility] for further reevaluation and management.  Ice and elevation for 

swelling.  Ibuprofen and Tylenol as needed for additional pain relief.  The 

patient was happy with plan of care, voiced understanding of all discharge 

instructions, and rated her discomfort a 5 out of 10 at the conclusion of my 

exam.





Medical Decision


She does not have physical exam findings suggestive of an acute rotator cuff 

tear.  X-rays are not suggestive of fracture, dislocation or acromioclavicular 

separation.  I do not suspect cervical spine injury.





Medication Reconcilliation


Current Medication List:  was personally reviewed by me





Blood Pressure Screening


Patient's blood pressure:  Normal blood pressure





Impression





 Primary Impression:  


 Right shoulder strain


 Additional Impression:  


 Status post fall





Departure Information


Referrals


No Doctor, Assigned (PCP)





Patient Instructions


My [facility]





Problem Qualifiers








 Primary Impression:  


 Right shoulder strain


 Encounter type:  initial encounter  Qualified Codes:  S46.911A - Strain of 

unspecified muscle, fascia and tendon at shoulder and upper arm level, right arm

, initial encounter